# Patient Record
Sex: MALE | Race: WHITE | URBAN - METROPOLITAN AREA
[De-identification: names, ages, dates, MRNs, and addresses within clinical notes are randomized per-mention and may not be internally consistent; named-entity substitution may affect disease eponyms.]

---

## 2018-03-31 ENCOUNTER — APPOINTMENT (OUTPATIENT)
Dept: CT IMAGING | Facility: HOSPITAL | Age: 33
End: 2018-03-31
Payer: MEDICAID

## 2018-03-31 PROBLEM — F29 PSYCHOSIS (HCC): Status: ACTIVE | Noted: 2018-03-31

## 2018-03-31 PROCEDURE — 70450 CT HEAD/BRAIN W/O DYE: CPT

## 2018-03-31 NOTE — ED NOTES
Spoke with Dr Dk Hatfield, pt is now accessible and ready to be seen by [facility]

## 2018-03-31 NOTE — ED NOTES
This RN received a telephone call from this patient's mother looking for this patient.  I told her that I could not give info over the phone, but I told the current RN at [facility] Charanjit Shah) to tell the patient that his mother is looking for him, and to call his mo

## 2018-03-31 NOTE — ED NOTES
Consulted with Dr Flash Clay. Pt is requiring inpatient admission.  Pt to be held on [facility] and [facility] will attempt to transfer pt to [facility]

## 2018-03-31 NOTE — ED PROVIDER NOTES
Psychiatry requested that we obtain a CT scan of patient's had as well as an ammonia level and give a dose of thiamine. We did do all of this.   His ammonia level was normal.  CT scan of head was normal.  He did receive further Ativan as he was getting dav

## 2018-03-31 NOTE — BH LEVEL OF CARE ASSESSMENT
Level of Care Assessment Note    General Questions  Why are you here?: \"People think im Jorge Negrete\" and then stated \"I have no idea whats going on, I just dont want to scare the kids\"   Precipitating Events: Pt presents to ED after being brought in b with personnel and was willing to come to ER but did not appear to fully understand circumstances. Officer IGNACIO Houston completed Petition for Involuntary Admission.  PD state they spoke to pt's sister who state that pt went missing between 24-48 hours ago and No  Access to Means Collateral Provided By[de-[email or web address]] Unable to obtain   Describe Access to Means Collateral: Unable to obtain     Self Injury  History of Self Injurious Behaviors: No  Present Self-Injurious Behaviors: No    Mental Health Symptoms  Hallucination Ty current use the most/worst it has ever been? : No    Illicit and Prescription Drug Use  Which if any illicit/prescription drugs have you used/abused?: Denies                                                                      Withdrawal Symptoms  History staff: Suspicious (Pt states \"How many people are outside waiting for me\")  Speech  Rate of Speech: Rapid  Flow of Speech: Rambling  Intensity of Volume: Ordinary  Clarity: Clear  Cognition  Concentration: Unimpaired  Memory: Recent memory intact; Remote Yes  Factors Mitigating Risk  Factors Mitigating Risk: Unable to obtain   SRAT Review  SRAT reviewed with: Dr Tapia Neigh

## 2018-03-31 NOTE — ED INITIAL ASSESSMENT (HPI)
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  Telling PD that he is the arch tati Cabello Fitting and scared that the police will ailyn

## 2018-03-31 NOTE — ED NOTES
Attempted to call report and they stated that the RN went to lunch and that we should call back in 30 minutes.

## 2018-03-31 NOTE — ED NOTES
Discussed case with Dr Sawyer Jordan, due to elevated liver function test , Dr Sawyer Jordan request Head Ct, check ammonia level and ask ER to give Thiamine 100 mg IV if not given yet .

## 2018-03-31 NOTE — ED PROVIDER NOTES
Patient Seen in: [facility] Emergency Department    History   Patient presents with:  Eval-P (psychiatric)    Stated Complaint: eval p    HPI    36-year-old male presents to the emergency department by EMS and [facility].   Patient reviewed. All other systems reviewed and negative except as noted above.     Physical Exam   ED Triage Vitals  BP: 143/93 [03/31/18 0129]  Pulse: 91 [03/31/18 0129]  Resp: 20 [03/31/18 0139]  Temp: 97.9 °F (36.6 °C) [03/31/18 0129]  Temp src: n/a  SpO2 Qualitative Positive (*)     All other components within normal limits    Narrative:     Results of the Urine Drug Screen should be used only for medical purposes.    ACETAMINOPHEN (TYLENOL), S - Normal   SALICYLATE, SERUM - Normal   AMMONIA, PLASMA - Jeraline Corolla differentiation.      SINUSES:           No sign of acute sinusitis.       MASTOIDS:          No sign of acute inflammation.     SKULL:             No evidence for fracture or osseous abnormality.     OTHER:             None.      Impression     CONCLUSION

## 2018-04-01 PROBLEM — F10.20 ALCOHOL DEPENDENCE (HCC): Status: RESOLVED | Noted: 2018-04-01 | Resolved: 2018-04-01

## 2018-04-01 PROBLEM — R79.89 ABNORMAL LFTS: Status: ACTIVE | Noted: 2018-04-01

## 2018-04-01 PROBLEM — F10.20 ALCOHOL DEPENDENCE (HCC): Status: ACTIVE | Noted: 2018-04-01

## 2018-04-01 PROBLEM — D69.6 THROMBOCYTOPENIA (HCC): Status: ACTIVE | Noted: 2018-04-01

## 2018-04-12 ENCOUNTER — HOSPITAL ENCOUNTER (OUTPATIENT)
Dept: MRI IMAGING | Facility: HOSPITAL | Age: 33
Discharge: HOME | End: 2018-04-12
Attending: Other
Payer: MEDICAID

## 2018-04-12 ENCOUNTER — NURSE ONLY (OUTPATIENT)
Dept: ELECTROPHYSIOLOGY | Facility: HOSPITAL | Age: 33
End: 2018-04-12
Attending: Other
Payer: MEDICAID

## 2018-04-12 DIAGNOSIS — F29 PSYCHOSIS, UNSPECIFIED PSYCHOSIS TYPE (HCC): ICD-10-CM

## 2018-04-12 PROCEDURE — 70551 MRI BRAIN STEM W/O DYE: CPT | Performed by: OTHER

## 2018-04-12 PROCEDURE — 95816 EEG AWAKE AND DROWSY: CPT | Performed by: OTHER

## 2018-04-12 PROCEDURE — 95819 EEG AWAKE AND ASLEEP: CPT

## 2018-04-12 NOTE — PROCEDURES
Date of Procedure: 4/12/2018    Procedure: EEG (ELECTROENCEPHALOGRAM)     DX: ABNORMAL BAHAVIOR  HX: PT IS A 33 Y/O MALE TRANSFERRED FROM [facility] TO [place] ON 4/25  BY POLICE AND EMS FOR WANDERING AND ERRATIC BEHAVIOR.  PT BOUGHT JEWERLY FOR 3 WOMEN HE

## 2018-04-23 ENCOUNTER — HOSPITAL ENCOUNTER (OUTPATIENT)
Dept: ULTRASOUND IMAGING | Facility: HOSPITAL | Age: 33
Discharge: HOME | End: 2018-04-23
Attending: INTERNAL MEDICINE

## 2018-04-23 PROCEDURE — 93970 EXTREMITY STUDY: CPT | Performed by: INTERNAL MEDICINE

## (undated) NOTE — MR AVS SNAPSHOT
After Visit Summary   4/12/2018    Niko Diallo    MRN: [id number]           Visit Information     Date & Time  4/12/2018 10:30 AM Provider  [address] [place] [address] Dept.  Phone  [phone]      Allergies as of 4/12/2018  Rev If you have questions, you can call [phone] to talk to our [address] staff. Remember, MyChart is NOT to be used for urgent needs. For medical emergencies, dial 911.     Sincerely,    [facility]             Educational Information    Your blood pressure ind non-emergency, consider your options before heading to an ER.          SAME DAY  APPOINTMENTS  Available at primary care offices      [facility]     Treatment for mild  illness or inj